# Patient Record
Sex: MALE | Race: OTHER
[De-identification: names, ages, dates, MRNs, and addresses within clinical notes are randomized per-mention and may not be internally consistent; named-entity substitution may affect disease eponyms.]

---

## 2017-05-01 NOTE — PHYS DOC
Past Medical History


Past Medical History:  No Pertinent History


Past Surgical History:  No Surgical History


Alcohol Use:  Occasionally


Drug Use:  None





Adult General


Chief Complaint


Chief Complaint:  EARACHE/EAR PAIN





HPI


HPI


Patient is a 31  year old male who presents with mild right ear pain that began 

2 weeks ago. Patient states he thought he had seasonal allergies because he had 

teary, and itchy eyes. The patient has not tried any medications. Patient 

denies any fever. Denies any coughing.





Review of Systems


Review of Systems





Constitutional: Denies fever or chills []


Eyes: Denies change in visual acuity, redness, or eye pain []


HENT: right ear pain


Respiratory: Denies cough or shortness of breath []


Cardiovascular: No additional information not addressed in HPI []


GI: Denies abdominal pain, nausea, vomiting, bloody stools or diarrhea []


: Denies dysuria or hematuria []


Musculoskeletal: Denies back pain or joint pain []


Integument: Denies rash or skin lesions []


Neurologic: Denies headache, focal weakness or sensory changes []


Endocrine: Denies polyuria or polydipsia []





Allergies


Allergies





 Allergies








Coded Allergies Type Severity Reaction Last Updated Verified


 


  No Known Drug Allergies    5/1/17 No











Physical Exam


Physical Exam





Constitutional: Well developed, well nourished, no acute distress, non-toxic 

appearance. []


HENT: Normocephalic, atraumatic, bilateral external ears normal, oropharynx 

moist, no oral exudates, nose normal. []


Right TM is moderately injected with effusion, left TM is mildly injected


Eyes: PERRLA, EOMI, conjunctiva normal, no discharge. [] 


Neck: Normal range of motion, no tenderness, supple, no stridor. [] 


Cardiovascular:Heart rate regular rhythm, no murmur []


Lungs & Thorax:  Bilateral breath sounds clear to auscultation []


Abdomen: Bowel sounds normal, soft, no tenderness, no masses, no pulsatile 

masses. [] 


Skin: Warm, dry, no erythema, no rash. [] 


Back: No tenderness, no CVA tenderness. [] 


Extremities: No tenderness, no cyanosis, no clubbing, ROM intact, no edema. [] 


Neurologic: Alert and oriented X 3, normal motor function, normal sensory 

function, no focal deficits noted. []


Psychologic: Affect normal, judgement normal, mood normal. []





Current Patient Data


Vital Signs





 Vital Signs








  Date Time  Temp Pulse Resp B/P Pulse Ox O2 Delivery O2 Flow Rate FiO2


 


5/1/17 08:49 98.3 94 18  98 Room Air  





 98.3       











EKG


EKG


[]





Radiology/Procedures


Radiology/Procedures


[]





Course & Med Decision Making


Course & Med Decision Making


Pertinent Labs and Imaging studies reviewed. (See chart for details)





Patient has bilateral otitis media with effusion. He also has seasonal 

allergies. Discharged with amoxicillin for 10 days. Discharged with Claritin-D. 

Follow-up with primary care doctor in 1-2 weeks. Tylenol /Motrin for pain or 

fever.





Dragon Disclaimer


Dragon Disclaimer


This electronic medical record was generated, in whole or in part, using a 

voice recognition dictation system.





Departure


Departure


Impression:  


 Primary Impression:  


 Otitis media with effusion


 Additional Impression:  


 Seasonal allergies


Disposition:  01 HOME, SELF-CARE


Condition:  STABLE


Patient Instructions:  Allergies, Generic, Otitis Media with Effusion





Additional Instructions:


You were seen for ear infection and seasonal allergies. Please take the 

antibiotics prescribed as ordered. Make sure you finish them. Take a 

decongestant like Claritin-D as well. Take Tylenol/ Motrin for pain or fever.


Scripts


Ibuprofen 800 Mg Tablet1 Mg PO PRN Q6HRS PRN INFLAMMATION #30 TAB


   Prov:AJ WALDRON         5/1/17


Loratadine/Pseudoephedrine (Claritin-D 12 Hour Tablet)1 Each Tab.er.12h1 Tab PO 

BID #20 TAB


   Prov:AJ WALDRON APRN         5/1/17


Amoxicillin 875 Mg Tablet1 Tab PO BID #20 TAB


   Prov:AJ WALDRON APRN         5/1/17





Problem Qualifiers








 Primary Impression:  


 Otitis media with effusion


 Laterality:  bilateral  Qualified Code:  H65.93 - Unspecified nonsuppurative 

otitis media, bilateral


 Additional Impression:  


 Seasonal allergies


 Allergic rhinitis trigger:  unspecified  Qualified Code:  J30.2 - Other 

seasonal allergic rhinitis





AJ WALDRON May 1, 2017 09:06

## 2019-08-22 ENCOUNTER — HOSPITAL ENCOUNTER (EMERGENCY)
Dept: HOSPITAL 61 - ER | Age: 34
Discharge: HOME | End: 2019-08-22
Payer: SELF-PAY

## 2019-08-22 VITALS — SYSTOLIC BLOOD PRESSURE: 166 MMHG | DIASTOLIC BLOOD PRESSURE: 85 MMHG

## 2019-08-22 VITALS — HEIGHT: 69 IN | BODY MASS INDEX: 23.7 KG/M2 | WEIGHT: 160 LBS

## 2019-08-22 DIAGNOSIS — L01.03: Primary | ICD-10-CM

## 2019-08-22 PROCEDURE — 99283 EMERGENCY DEPT VISIT LOW MDM: CPT

## 2019-08-22 PROCEDURE — 82962 GLUCOSE BLOOD TEST: CPT

## 2019-08-22 NOTE — PHYS DOC
Past Medical History


Past Medical History:  No Pertinent History


 (NOLAN BROWN)


Past Surgical History:  No Surgical History


 (NOLAN BROWN)


Alcohol Use:  Occasionally


Drug Use:  None


 (NOLAN BROWN)





Adult General


Chief Complaint


Chief Complaint:  SKIN RASH/ABSCESS





HPI


HPI





Patient is a 33  year old male who presents with complaints of a rash to his 

left chest, abdomen, and bilateral groins for the last 2 months. He denies any 

drainage or bleeding from the sites. He reports severe itching. He denies any 

new medications, perfumes, detergents, foods, or medications. Pt states his 

friend had a similar rash that he was prescribed Claritin and 12 days of 

prednisone for and his friend's symptoms went away. Pt denies any pain at this 

time. 





ROS


Pt denies any fever, cough, shortness of breath, sore throat, ear pain, nausea, 

vomiting, or diarrhea. He denies any hx of diabetes. Pt states that his rash beg

an on his left chest and has since spread to other areas. All other ROS is neg 

unless otherwise noted in HPI.


 (NOLAN BROWN)





Review of Systems


Review of Systems


See Above


 (NOLAN BROWN)





Allergies


Allergies





Allergies








Coded Allergies Type Severity Reaction Last Updated Verified


 


  No Known Drug Allergies    5/1/17 No





 (ELLEN OSUNA MD)





Physical Exam


Physical Exam


See Above


Constitutional: Well developed, well nourished, no acute distress, non-toxic 

appearance. []


HENT: Normocephalic, atraumatic, bilateral external ears normal, oropharynx 

moist, no oral exudates, nose normal. []


Eyes: PERRLA, EOMI, conjunctiva normal, no discharge. [] 


Neck: Normal range of motion, no tenderness, supple, no stridor. [] 


Cardiovascular: Heart rate regular rhythm


Lungs & Thorax:  Bilateral breath sounds clear to auscultation, no retractions 

[]


Skin: Warm, dry, no warmth; scattered erythremic maculopapular patches some with

 honey colored crusting noted to left chest, abdomen, and bilateral groins 

consistent with impetigo vs contact dermatitis.[] 


Extremities: No tenderness, no cyanosis, no clubbing, ROM intact, no edema. [] 


Neurologic: Alert and oriented X 3, normal motor function, normal sensory 

function, no focal deficits noted. []


Psychologic: Affect normal, judgement normal, mood normal. []


 (NOLAN BROWN)





Current Patient Data


Vital Signs





                                   Vital Signs








  Date Time  Temp Pulse Resp B/P (MAP) Pulse Ox O2 Delivery O2 Flow Rate FiO2


 


8/22/19 12:57 97.5 79 16 166/85 (112) 98 Room Air  





 97.5       





 (ELLEN OSUNA MD)


Lab Values





                                Laboratory Tests








Test


 8/22/19


13:40


 


Glucose (Fingerstick)


 87 mg/dL


(70-99)





 (ELLEN OSUNA MD)


Lab Values





                                Laboratory Tests








Test


 8/22/19


13:40


 


Glucose (Fingerstick)


 87 mg/dL


(70-99)








 (NOLAN BROWN)





EKG


EKG


[]


 (NOLAN BROWN)





Radiology/Procedures


Radiology/Procedures


[]


 (NOLAN BROWN)





Course & Med Decision Making


Course & Med Decision Making


Pertinent Labs and Imaging studies reviewed. (See chart for details)





Dx: impetigo, contact dermatitis


Pt was also evaluated by Dr. Osuna. Prescriptions written for prednisone 

taper, mupirocin ointment, and keflex. Pt instructed to fill the prescriptions 

and use as directed. Also take an over the counter antihistamine such as 

Claritin once daily. Keep your fingernails trimmed short and apply mupirocin 

ointment under your nails twice a day until the rash is gone. Follow up with Dr. Stockton for re-evaluation, return to the ER if symptoms worsen. 


Patient verbalized an understanding of home care, medications, follow-up, and 

return to ED instructions and was in agreement with the plan of care.





[]


 (NOLAN BROWN)


Course & Med Decision Making


ER PHYSICIAN ATTENDING NOTE: I have personally seen and examined the patient, 

and agree with the history, physical exam, and plan, as documented by mid-level 

provider.


 (ELLEN OSUNA MD)


Dragon Disclaimer


Dragon Disclaimer


This electronic medical record was generated, in whole or in part, using a voice

 recognition dictation system.


 (NOLAN BROWN)





Departure


Departure


Impression:  


   Primary Impression:  


   Impetigo bullosa


Disposition:  01 HOME, SELF-CARE


Condition:  STABLE


Referrals:  


NO PCP (PCP)








OPHELIA STOCKTON MD


Patient Instructions:  Impetigo





Additional Instructions:  


Fill the prescriptions and use as directed. Also take an over the counter 

antihistamine such as Claritin once daily. Keep your fingernails trimmed short 

and apply mupirocin ointment under your nails twice a day until the rash is 

gone. Follow up with Dr. Stockton for re-evaluation, return to the ER if symptoms 

worsen.


Scripts


Prednisone (PREDNISONE) 20 Mg Tablet


1 TAB PO UD for 12 Days, #15 TAB 0 Refills


   Take 2 tabs by mouth days 1,2,3


   take 1.5 tabs by mouth days 4,5,6


   take 1 tab by mouth days 7,8,9


   take 0.5 tab by mouth days 10,11,12


   Prov: NOLAN BROWN         8/22/19 


Mupirocin (MUPIROCIN OINTMENT) 22 Gm Oint...g.


1 LATESHA TP TID for WOUND CARE for 7 Days, #30 GM


   Prov: NOLAN BROWN         8/22/19 


Cephalexin (KEFLEX) 500 Mg Capsule


1 CAP PO BID, #[id number] CAP 0 Refills


   Prov: NOLAN BROWN         8/22/19











NOLAN BROWN       Aug 22, 2019 13:41


ELLEN OSUNA MD           Aug 22, 2019 13:43